# Patient Record
Sex: FEMALE | Race: WHITE | NOT HISPANIC OR LATINO | Employment: FULL TIME | URBAN - METROPOLITAN AREA
[De-identification: names, ages, dates, MRNs, and addresses within clinical notes are randomized per-mention and may not be internally consistent; named-entity substitution may affect disease eponyms.]

---

## 2017-11-15 ENCOUNTER — OFFICE VISIT (OUTPATIENT)
Dept: OBSTETRICS AND GYNECOLOGY | Facility: CLINIC | Age: 27
End: 2017-11-15

## 2017-11-15 VITALS
HEIGHT: 64 IN | SYSTOLIC BLOOD PRESSURE: 124 MMHG | WEIGHT: 292 LBS | BODY MASS INDEX: 49.85 KG/M2 | DIASTOLIC BLOOD PRESSURE: 84 MMHG

## 2017-11-15 DIAGNOSIS — Z12.4 PAP SMEAR FOR CERVICAL CANCER SCREENING: ICD-10-CM

## 2017-11-15 DIAGNOSIS — E66.01 OBESITY, MORBID, BMI 50 OR HIGHER (HCC): ICD-10-CM

## 2017-11-15 DIAGNOSIS — N97.0 ANOVULATORY BLEEDING: ICD-10-CM

## 2017-11-15 DIAGNOSIS — Z01.419 WELL FEMALE EXAM WITH ROUTINE GYNECOLOGICAL EXAM: Primary | ICD-10-CM

## 2017-11-15 DIAGNOSIS — Z13.89 SCREENING FOR BLOOD OR PROTEIN IN URINE: ICD-10-CM

## 2017-11-15 DIAGNOSIS — Z11.3 SCREENING FOR STD (SEXUALLY TRANSMITTED DISEASE): ICD-10-CM

## 2017-11-15 LAB
BILIRUB BLD-MCNC: NEGATIVE MG/DL
CLARITY, POC: CLEAR
COLOR UR: YELLOW
GLUCOSE UR STRIP-MCNC: NEGATIVE MG/DL
KETONES UR QL: NEGATIVE
LEUKOCYTE EST, POC: ABNORMAL
NITRITE UR-MCNC: NEGATIVE MG/ML
PH UR: 7 [PH] (ref 5–8)
PROT UR STRIP-MCNC: NEGATIVE MG/DL
RBC # UR STRIP: ABNORMAL /UL
SP GR UR: 1.02 (ref 1–1.03)
UROBILINOGEN UR QL: NORMAL

## 2017-11-15 PROCEDURE — 99395 PREV VISIT EST AGE 18-39: CPT | Performed by: OBSTETRICS & GYNECOLOGY

## 2017-11-15 PROCEDURE — 81002 URINALYSIS NONAUTO W/O SCOPE: CPT | Performed by: OBSTETRICS & GYNECOLOGY

## 2017-11-15 RX ORDER — MEDROXYPROGESTERONE ACETATE 10 MG/1
10 TABLET ORAL DAILY
Qty: 30 TABLET | Refills: 4 | Status: SHIPPED | OUTPATIENT
Start: 2017-11-15 | End: 2017-11-25

## 2017-11-15 NOTE — PROGRESS NOTES
[facility]  Routine Annual Visit    11/15/2017    Patient: Zoila MERRITT          MR#:[id number]      History of Present Illness    27 y.o. female  who presents for annual exam.    The patient reports a long-standing problem with irregular bleeding and irregular periods.  The patient states most recently she and skipping cycles for several months and then had prolonged bleeding lasting July through October.  We discussed options for management of patient does not wish to take oral contraceptive pills.  The patient does not desire pregnancy and her  is considering vasectomy.    Patient's last menstrual period was 11/10/2017.  Obstetric History:  OB History      Para Term  AB Living    0 0 0 0 0 0    SAB TAB Ectopic Multiple Live Births    0 0 0 0          Menstrual History:     Patient's last menstrual period was 11/10/2017.       Sexual History:       ________________________________________  Patient Active Problem List   Diagnosis   • Well female exam with routine gynecological exam   • Anovulatory bleeding   • Obesity, morbid, BMI 50 or higher       Past Medical History:   Diagnosis Date   • Bartholin cyst 2011       Past Surgical History:   Procedure Laterality Date   • ENDOSCOPY      Dr. Garnica   • KNEE SURGERY Right        History   Smoking Status   • Former Smoker   • Packs/day: 0.50   • Years: 2.00   • Types: Cigarettes   Smokeless Tobacco   • Never Used       Family History   Problem Relation Age of Onset   • Hypertension Mother    • Diabetes Mother    • Heart attack Mother    • Lymphoma Father    • Diabetes Maternal Aunt    • Diabetes Maternal Uncle    • Breast cancer Paternal Grandmother        Prior to Admission medications    Not on File     ________________________________________    Current contraception: none  History of abnormal Pap smear: no  Family history of uterine or ovarian cancer: no  Family History of colon cancer/colon polyps:  "no  History of abnormal mammogram: no  History of abnormal lipids: no    The following portions of the patient's history were reviewed and updated as appropriate: allergies, current medications, past family history, past medical history, past social history, past surgical history and problem list.    Review of Systems    Pertinent items are noted in HPI.     Objective   Physical Exam    /84  Ht 64\" (162.6 cm)  Wt 292 lb (132 kg)  LMP 11/10/2017  Breastfeeding? No  BMI 50.12 kg/m2   BP Readings from Last 3 Encounters:   11/15/17 124/84   06/22/16 132/90      Wt Readings from Last 3 Encounters:   11/15/17 292 lb (132 kg)   06/22/16 258 lb (117 kg)        BMI: Estimated body mass index is 50.12 kg/(m^2) as calculated from the following:    Height as of this encounter: 64\" (162.6 cm).    Weight as of this encounter: 292 lb (132 kg).    General:   alert, appears stated age and cooperative   Heart: regular rate and rhythm, S1, S2 normal, no murmur, click, rub or gallop   Lungs: clear to auscultation bilaterally   Abdomen: soft, non-tender, without masses or organomegaly and obese   Breast: inspection negative, no nipple discharge or bleeding, no masses or nodularity palpable with piercings   Vulva: normal   Vagina: normal mucosa   Cervix: no lesions   Uterus: normal size or exam limited by habitus   Adnexa: exam limited by habitus     As part of wellness and prevention, the following topics were discussed with the patient:  []  Nutrition  []  Physical activity/regular exercise   [x]  Healthy weight  []  Injury prevention  []  Substance misuse/abuse  []  Sexual behavior  []  STD prevention  [x]  Contaception  []  Dental health  []  Mental health  []  Immunization  [x]  Encouraged SBE       Assessment:    Zoila was seen today for gynecologic exam.    Diagnoses and all orders for this visit:    Well female exam with routine gynecological exam    Screening for blood or protein in urine  -     POC Urinalysis " Dipstick    Pap smear for cervical cancer screening  -     IGP, Rfx Aptima HPV ASCU - ThinPrep Vial, Cervix    Anovulatory bleeding  -     TSH  -     Luteinizing hormone  -     Follicle stimulating hormone  -     Prolactin  -     T4, free    Obesity, morbid, BMI 50 or higher  -     CBC & Differential  -     Comprehensive Metabolic Panel  -     Hemoglobin A1c    Screening for STD (sexually transmitted disease)  -     Chlamydia trachomatis, Neisseria gonorrhoeae, Trichomonas vaginalis, PCR - Swab, Cervix    Other orders  -     medroxyPROGESTERone (PROVERA) 10 MG tablet; Take 1 tablet by mouth Daily for 10 days.          Plan:  Return in about 1 year (around 11/15/2018) for Annual GYN exam.      Jamel Carver MD  11/15/2017 2:33 PM

## 2017-11-16 ENCOUNTER — TELEPHONE (OUTPATIENT)
Dept: OBSTETRICS AND GYNECOLOGY | Facility: CLINIC | Age: 27
End: 2017-11-16

## 2017-11-16 LAB
ALBUMIN SERPL-MCNC: 4.4 G/DL (ref 3.5–5.5)
ALBUMIN/GLOB SERPL: 1.4 {RATIO} (ref 1.2–2.2)
ALP SERPL-CCNC: 85 IU/L (ref 39–117)
ALT SERPL-CCNC: 19 IU/L (ref 0–32)
AST SERPL-CCNC: 21 IU/L (ref 0–40)
BASOPHILS # BLD AUTO: 0 X10E3/UL (ref 0–0.2)
BASOPHILS NFR BLD AUTO: 0 %
BILIRUB SERPL-MCNC: <0.2 MG/DL (ref 0–1.2)
BUN SERPL-MCNC: 8 MG/DL (ref 6–20)
BUN/CREAT SERPL: 10 (ref 9–23)
CALCIUM SERPL-MCNC: 9.8 MG/DL (ref 8.7–10.2)
CHLORIDE SERPL-SCNC: 99 MMOL/L (ref 96–106)
CO2 SERPL-SCNC: 21 MMOL/L (ref 18–29)
CREAT SERPL-MCNC: 0.8 MG/DL (ref 0.57–1)
EOSINOPHIL # BLD AUTO: 0.3 X10E3/UL (ref 0–0.4)
EOSINOPHIL NFR BLD AUTO: 3 %
ERYTHROCYTE [DISTWIDTH] IN BLOOD BY AUTOMATED COUNT: 14.5 % (ref 12.3–15.4)
FSH SERPL-ACNC: 6.7 MIU/ML
GFR SERPLBLD CREATININE-BSD FMLA CKD-EPI: 100 ML/MIN/1.73
GFR SERPLBLD CREATININE-BSD FMLA CKD-EPI: 115 ML/MIN/1.73
GLOBULIN SER CALC-MCNC: 3.1 G/DL (ref 1.5–4.5)
GLUCOSE SERPL-MCNC: 90 MG/DL (ref 65–99)
HBA1C MFR BLD: 5.7 % (ref 4.8–5.6)
HCT VFR BLD AUTO: 38.2 % (ref 34–46.6)
HGB BLD-MCNC: 12.8 G/DL (ref 11.1–15.9)
IMM GRANULOCYTES # BLD: 0 X10E3/UL (ref 0–0.1)
IMM GRANULOCYTES NFR BLD: 0 %
LH SERPL-ACNC: 3.6 MIU/ML
LYMPHOCYTES # BLD AUTO: 3.8 X10E3/UL (ref 0.7–3.1)
LYMPHOCYTES NFR BLD AUTO: 32 %
MCH RBC QN AUTO: 28.6 PG (ref 26.6–33)
MCHC RBC AUTO-ENTMCNC: 33.5 G/DL (ref 31.5–35.7)
MCV RBC AUTO: 86 FL (ref 79–97)
MONOCYTES # BLD AUTO: 0.7 X10E3/UL (ref 0.1–0.9)
MONOCYTES NFR BLD AUTO: 6 %
NEUTROPHILS # BLD AUTO: 6.9 X10E3/UL (ref 1.4–7)
NEUTROPHILS NFR BLD AUTO: 59 %
PLATELET # BLD AUTO: 445 X10E3/UL (ref 150–379)
POTASSIUM SERPL-SCNC: 4.8 MMOL/L (ref 3.5–5.2)
PROLACTIN SERPL-MCNC: 18.2 NG/ML (ref 4.8–23.3)
PROT SERPL-MCNC: 7.5 G/DL (ref 6–8.5)
RBC # BLD AUTO: 4.47 X10E6/UL (ref 3.77–5.28)
SODIUM SERPL-SCNC: 142 MMOL/L (ref 134–144)
T4 FREE SERPL-MCNC: 1.1 NG/DL (ref 0.82–1.77)
TSH SERPL DL<=0.005 MIU/L-ACNC: 3.47 UIU/ML (ref 0.45–4.5)
WBC # BLD AUTO: 11.9 X10E3/UL (ref 3.4–10.8)

## 2017-11-16 NOTE — TELEPHONE ENCOUNTER
----- Message from Jamel Carver MD sent at 11/16/2017 10:45 AM EST -----  Call pt:  Hormonal profile for abnormal bleeding is normal    A1c is mildly elevated suggesting early onset of glucose intolerance and diabetes    Carbohydrate restriction and weight reduction is in order    Take Provera for cycle regulation as instructed

## 2017-11-16 NOTE — PROGRESS NOTES
Call pt:  Hormonal profile for abnormal bleeding is normal    A1c is mildly elevated suggesting early onset of glucose intolerance and diabetes    Carbohydrate restriction and weight reduction is in order    Take Provera for cycle regulation as instructed

## 2017-11-17 LAB
C TRACH RRNA SPEC QL NAA+PROBE: NEGATIVE
N GONORRHOEA RRNA SPEC QL NAA+PROBE: NEGATIVE
T VAGINALIS RRNA SPEC QL NAA+PROBE: NEGATIVE

## 2017-11-20 ENCOUNTER — TELEPHONE (OUTPATIENT)
Dept: OBSTETRICS AND GYNECOLOGY | Facility: CLINIC | Age: 27
End: 2017-11-20

## 2017-11-20 LAB
CONV .: NORMAL
CYTOLOGIST CVX/VAG CYTO: NORMAL
CYTOLOGY CVX/VAG DOC THIN PREP: NORMAL
DX ICD CODE: NORMAL
HIV 1 & 2 AB SER-IMP: NORMAL
OTHER STN SPEC: NORMAL
PATH REPORT.FINAL DX SPEC: NORMAL
PATHOLOGIST CVX/VAG CYTO: NORMAL
STAT OF ADQ CVX/VAG CYTO-IMP: NORMAL

## 2017-11-20 NOTE — TELEPHONE ENCOUNTER
----- Message from Jamel Carver MD sent at 11/20/2017 12:43 PM EST -----  Call pt:  PAP is negative.

## 2017-11-21 ENCOUNTER — TELEPHONE (OUTPATIENT)
Dept: OBSTETRICS AND GYNECOLOGY | Facility: CLINIC | Age: 27
End: 2017-11-21

## 2021-07-26 ENCOUNTER — OFFICE VISIT (OUTPATIENT)
Dept: OBSTETRICS AND GYNECOLOGY | Age: 31
End: 2021-07-26

## 2021-07-26 VITALS
DIASTOLIC BLOOD PRESSURE: 80 MMHG | HEIGHT: 64 IN | BODY MASS INDEX: 50.02 KG/M2 | WEIGHT: 293 LBS | SYSTOLIC BLOOD PRESSURE: 130 MMHG

## 2021-07-26 DIAGNOSIS — Z12.4 SCREENING FOR MALIGNANT NEOPLASM OF CERVIX: ICD-10-CM

## 2021-07-26 DIAGNOSIS — Z11.51 SCREENING FOR HUMAN PAPILLOMAVIRUS (HPV): ICD-10-CM

## 2021-07-26 DIAGNOSIS — Z01.419 WELL FEMALE EXAM WITH ROUTINE GYNECOLOGICAL EXAM: Primary | ICD-10-CM

## 2021-07-26 DIAGNOSIS — N97.0 ANOVULATORY BLEEDING: ICD-10-CM

## 2021-07-26 DIAGNOSIS — E66.01 OBESITY, MORBID, BMI 50 OR HIGHER (HCC): ICD-10-CM

## 2021-07-26 PROCEDURE — 99213 OFFICE O/P EST LOW 20 MIN: CPT | Performed by: OBSTETRICS & GYNECOLOGY

## 2021-07-26 PROCEDURE — 99385 PREV VISIT NEW AGE 18-39: CPT | Performed by: OBSTETRICS & GYNECOLOGY

## 2021-07-26 RX ORDER — MEDROXYPROGESTERONE ACETATE 10 MG/1
10 TABLET ORAL DAILY
Qty: 30 TABLET | Refills: 3 | Status: SHIPPED | OUTPATIENT
Start: 2021-07-26 | End: 2021-12-06

## 2021-07-26 NOTE — PROGRESS NOTES
Routine Annual Visit    2021    Patient: Zoila Harden          MR#:[id number]    History of Present Illness    Chief Complaint   Patient presents with   • Gynecologic Exam     last pap 2017 neg    • Menstrual Problem     pt c/o irregular periods missing one in  and April, started end of May an bled for 7 weeks then stopped, started bleding with intercourse Saturday but stopped. She did notice a little bit of blood this morning when she wiped        30 y.o. female  who presents for annual exam.    The patient presents for routine annual exam with complaint of irregularities in her cycle the last 6 months.  The patient has a long history of chronic anovulation complicated by a class III morbid obesity.  The patient does not desire pregnancy but does not want to do anything to prevent pregnancy.    Studies reviewed:    No LMP recorded.  Obstetric History:  OB History        0    Para   0    Term   0       0    AB   0    Living   0       SAB   0    TAB   0    Ectopic   0    Molar        Multiple   0    Live Births                   Menstrual History:     No LMP recorded.       Sexual History:       ________________________________________  Patient Active Problem List   Diagnosis   • Well female exam with routine gynecological exam   • Anovulatory bleeding   • Obesity, morbid, BMI 50 or higher (CMS/HCC)     Past Medical History:   Diagnosis Date   • Bartholin cyst 2011     Past Surgical History:   Procedure Laterality Date   • ENDOSCOPY      Dr. Garnica   • KNEE SURGERY Right      Social History     Tobacco Use   Smoking Status Former Smoker   • Packs/day: 0.50   • Years: 2.00   • Pack years: 1.00   • Types: Cigarettes   Smokeless Tobacco Never Used     Family History   Problem Relation Age of Onset   • Hypertension Mother    • Diabetes Mother    • Heart attack Mother    • Lymphoma Father    • Diabetes Maternal Aunt    • Diabetes Maternal Uncle    • Breast cancer Paternal  "Grandmother      Prior to Admission medications    Not on File     ________________________________________    Current contraception: none  History of abnormal Pap smear: no  Family history of uterine or ovarian cancer: no  Family History of colon cancer/colon polyps: no  History of abnormal mammogram: no  History of abnormal lipids: no    The following portions of the patient's history were reviewed and updated as appropriate: allergies, current medications, past family history, past medical history, past social history, past surgical history and problem list.    Review of Systems    Pertinent items are noted in HPI.       Objective   Physical Exam    /80   Ht 162.6 cm (64\")   Wt (!) 138 kg (304 lb)   BMI 52.18 kg/m²    BP Readings from Last 3 Encounters:   07/26/21 130/80   11/15/17 124/84   06/22/16 132/90      Wt Readings from Last 3 Encounters:   07/26/21 (!) 138 kg (304 lb)   11/15/17 132 kg (292 lb)   06/22/16 117 kg (258 lb)        BMI: Estimated body mass index is 52.18 kg/m² as calculated from the following:    Height as of this encounter: 162.6 cm (64\").    Weight as of this encounter: 138 kg (304 lb).       General: alert, appears stated age, cooperative and morbidly obese   Heart: regular rate and rhythm, S1, S2 normal, no murmur, click, rub or gallop   Lungs: clear to auscultation bilaterally   Abdomen: obes and soft, non-tender, without masses, no organomegaly   Breast: inspection negative, no nipple discharge or bleeding, no masses or nodularity palpable   External genitalia/Vulva: External genitalia including bartholin's glands, Urethra, Belk's gland and urethra meatus are normal, Perineum, rectum and anus appear normal  and Bladder appears normal without significant prolapse    Vagina: normal mucosa, normal discharge   Cervix: no lesions and old blood   Uterus: normal size, mobile, non-tender and exam is limited habitus    Adnexa: exam limited by habitus     As part of wellness and " prevention, the following topics were discussed with the patient:  Encouraged self breast exam  Physical activity and regular exercised encouraged.       Assessment:    Diagnoses and all orders for this visit:    1. Well female exam with routine gynecological exam (Primary)  -     IgP, Aptima HPV    2. Anovulatory bleeding  -     medroxyPROGESTERone (Provera) 10 MG tablet; Take 1 tablet by mouth Daily for 10 days. Take one tablet for 10 days each month  Dispense: 30 tablet; Refill: 3    3. Obesity, morbid, BMI 50 or higher (CMS/HCC)    4. Screening for human papillomavirus (HPV)  -     IgP, Aptima HPV    5. Screening for malignant neoplasm of cervix  -     IgP, Aptima HPV        Plan:  Return in about 1 year (around 7/26/2022) for Annual GYN exam.  Pelvic ultrasound to evaluate endometrial thickness    Jamel Carver MD  7/26/2021 09:38 EDT

## 2021-07-28 LAB
CYTOLOGIST CVX/VAG CYTO: NORMAL
CYTOLOGY CVX/VAG DOC CYTO: NORMAL
CYTOLOGY CVX/VAG DOC THIN PREP: NORMAL
DX ICD CODE: NORMAL
HIV 1 & 2 AB SER-IMP: NORMAL
HPV I/H RISK 4 DNA CVX QL PROBE+SIG AMP: NEGATIVE
OTHER STN SPEC: NORMAL
STAT OF ADQ CVX/VAG CYTO-IMP: NORMAL

## 2021-12-05 DIAGNOSIS — N97.0 ANOVULATORY BLEEDING: ICD-10-CM

## 2021-12-06 RX ORDER — MEDROXYPROGESTERONE ACETATE 10 MG/1
10 TABLET ORAL DAILY
Qty: 30 TABLET | Refills: 3 | Status: SHIPPED | OUTPATIENT
Start: 2021-12-06 | End: 2021-12-16

## 2023-05-24 ENCOUNTER — OFFICE VISIT (OUTPATIENT)
Dept: OBSTETRICS AND GYNECOLOGY | Age: 33
End: 2023-05-24
Payer: COMMERCIAL

## 2023-05-24 VITALS
WEIGHT: 280 LBS | SYSTOLIC BLOOD PRESSURE: 130 MMHG | HEIGHT: 64 IN | DIASTOLIC BLOOD PRESSURE: 70 MMHG | BODY MASS INDEX: 47.8 KG/M2

## 2023-05-24 DIAGNOSIS — E66.01 MORBID OBESITY WITH BMI OF 45.0-49.9, ADULT: ICD-10-CM

## 2023-05-24 DIAGNOSIS — N97.1 INFERTILITY, TUBAL ORIGIN: ICD-10-CM

## 2023-05-24 DIAGNOSIS — Z11.51 SCREENING FOR HUMAN PAPILLOMAVIRUS (HPV): ICD-10-CM

## 2023-05-24 DIAGNOSIS — Z01.419 WELL FEMALE EXAM WITH ROUTINE GYNECOLOGICAL EXAM: Primary | ICD-10-CM

## 2023-05-24 DIAGNOSIS — Z13.89 SCREENING FOR BLOOD OR PROTEIN IN URINE: ICD-10-CM

## 2023-05-24 DIAGNOSIS — N91.2 ANOVULATORY AMENORRHEA: ICD-10-CM

## 2023-05-24 DIAGNOSIS — Z11.3 SCREEN FOR STD (SEXUALLY TRANSMITTED DISEASE): ICD-10-CM

## 2023-05-24 DIAGNOSIS — Z12.4 SCREENING FOR MALIGNANT NEOPLASM OF CERVIX: ICD-10-CM

## 2023-05-24 PROBLEM — N97.0 ANOVULATORY BLEEDING: Status: RESOLVED | Noted: 2017-11-15 | Resolved: 2023-05-24

## 2023-05-24 LAB
BILIRUB BLD-MCNC: NEGATIVE MG/DL
CLARITY, POC: CLEAR
COLOR UR: YELLOW
GLUCOSE UR STRIP-MCNC: NEGATIVE MG/DL
KETONES UR QL: NEGATIVE
LEUKOCYTE EST, POC: NEGATIVE
NITRITE UR-MCNC: NEGATIVE MG/ML
PH UR: 5.5 [PH] (ref 5–8)
PROT UR STRIP-MCNC: NEGATIVE MG/DL
RBC # UR STRIP: ABNORMAL /UL
SP GR UR: 1.03 (ref 1–1.03)
UROBILINOGEN UR QL: NORMAL

## 2023-05-24 NOTE — ASSESSMENT & PLAN NOTE
Previously placed on Provera for cycle regulation and prevention of endometrial hyperplasia.  The patient quit taking the medicine in October and has since had fairly spontaneous regular menstrual periods.  With her periods being fairly regular she wishes to discontinue Provera at this time.  We discussed the risk of endometrial hyperplasia associated chronic anovulation and no menstrual cycles.  The patient is instructed to restart the Provera if she goes more than 2 months without having a period.

## 2023-05-24 NOTE — PROGRESS NOTES
[facility]   Obstetrics and Gynecology     2023    Patient: Zoila Harden          MR#:[id number]    History of Present Illness    Chief Complaint   Patient presents with   • Gynecologic Exam     CC: Annual, last pap 21 neg, HPV neg       32 y.o. female  who presents for annual exam.    Relevant data reviewed:    Patient's last menstrual period was 2023.  Obstetric History:  OB History        0    Para   0    Term   0       0    AB   0    Living   0       SAB   0    IAB   0    Ectopic   0    Molar        Multiple   0    Live Births                   Menstrual History:     Patient's last menstrual period was 2023.       Social History     Substance and Sexual Activity   Sexual Activity Yes   • Partners: Male   • Birth control/protection: None     ______________________________________  Patient Active Problem List   Diagnosis   • Morbid obesity with BMI of 45.0-49.9, adult   • Anovulatory amenorrhea   • Infertility, tubal origin     Past Medical History:   Diagnosis Date   • Bartholin cyst 2011     Past Surgical History:   Procedure Laterality Date   • ENDOSCOPY      Dr. Garnica   • KNEE SURGERY Right      Social History     Tobacco Use   Smoking Status Former   • Packs/day: 0.50   • Years: 2.00   • Pack years: 1.00   • Types: Cigarettes   • Passive exposure: Past   Smokeless Tobacco Never     Family History   Problem Relation Age of Onset   • Lymphoma Father    • Hypertension Mother    • Diabetes Mother    • Heart attack Mother    • Breast cancer Paternal Grandmother    • Diabetes Maternal Aunt    • Diabetes Maternal Uncle    • Ovarian cancer Neg Hx    • Uterine cancer Neg Hx    • Colon cancer Neg Hx      Prior to Admission medications    Not on File     _______________________________________    Current contraception: none  History of abnormal Pap smear: no  Family history of uterine or ovarian cancer: no  Family History of colon cancer/colon  "polyps: no  History of abnormal mammogram: no  History of abnormal lipids: no    The following portions of the patient's history were reviewed and updated as appropriate: allergies, current medications, past family history, past medical history, past social history, past surgical history and problem list.    Review of Systems    Pertinent items are noted in HPI.       Objective   Physical Exam    /70   Ht 162.6 cm (64\")   Wt 127 kg (280 lb)   LMP 2023   BMI 48.06 kg/m²    BP Readings from Last 3 Encounters:   23 130/70   21 130/80   11/15/17 124/84      Wt Readings from Last 3 Encounters:   23 127 kg (280 lb)   21 (!) 138 kg (304 lb)   11/15/17 132 kg (292 lb)        BMI: Estimated body mass index is 48.06 kg/m² as calculated from the following:    Height as of this encounter: 162.6 cm (64\").    Weight as of this encounter: 127 kg (280 lb).       General: alert, appears stated age, cooperative and mildly obese   Heart: regular rate and rhythm, S1, S2 normal, no murmur, click, rub or gallop   Lungs: clear to auscultation bilaterally   Abdomen: soft, non-tender, without masses, no organomegaly   Breast: inspection negative, no nipple discharge or bleeding, no masses or nodularity palpable   External genitalia/Vulva: External genitalia including bartholin's glands, Urethra, [place]'s gland and urethra meatus are normal, Perineum, rectum and anus appear normal  and Bladder appears normal without significant prolapse    Vagina: normal mucosa, normal discharge   Cervix: no lesions   Uterus: normal size and non-tender   Adnexa: normal adnexa     As part of wellness and prevention, the following topics were discussed with the patient:  Encouraged self breast exam  Physical activity and regular exercised encouraged.   Healthy weight discussed.  Contraception discussed.         Problem List   Meds  History  Prep for Surg   Imagin}    Assessment:  Diagnoses and all orders for this " visit:    1. Well female exam with routine gynecological exam (Primary)  -     IGP, Apt HPV,rfx 16 / 18,45    2. Screening for blood or protein in urine  -     POC Urinalysis Dipstick    3. Screening for human papillomavirus (HPV)  -     IGP, Apt HPV,rfx 16 / 18,45    4. Screening for malignant neoplasm of cervix  -     IGP, Apt HPV,rfx 16 / 18,45    5. Screen for STD (sexually transmitted disease)  -     NuSwab VG+ - Swab, Vagina    6. Morbid obesity with BMI of 45.0-49.9, adult    7. Anovulatory amenorrhea  Assessment & Plan:  Previously placed on Provera for cycle regulation and prevention of endometrial hyperplasia.  The patient quit taking the medicine in October and has since had fairly spontaneous regular menstrual periods.  With her periods being fairly regular she wishes to discontinue Provera at this time.  We discussed the risk of endometrial hyperplasia associated chronic anovulation and no menstrual cycles.  The patient is instructed to restart the Provera if she goes more than 2 months without having a period.      8. Infertility, tubal origin  Assessment & Plan:  Longstanding failure to conceive in the setting of no prevention.  Not opposed to pregnancy but does not desire contraception at this time      Plan:  Return in 1 year (on 5/24/2024) for Annual exam.    Jamel Carver MD  5/24/2023 08:39 EDT

## 2023-05-24 NOTE — ASSESSMENT & PLAN NOTE
Longstanding failure to conceive in the setting of no prevention.  Not opposed to pregnancy but does not desire contraception at this time

## 2023-05-26 DIAGNOSIS — N76.0 BV (BACTERIAL VAGINOSIS): Primary | ICD-10-CM

## 2023-05-26 DIAGNOSIS — B96.89 BV (BACTERIAL VAGINOSIS): Primary | ICD-10-CM

## 2023-05-26 LAB
A VAGINAE DNA VAG QL NAA+PROBE: ABNORMAL SCORE
BVAB2 DNA VAG QL NAA+PROBE: ABNORMAL SCORE
C ALBICANS DNA VAG QL NAA+PROBE: NEGATIVE
C GLABRATA DNA VAG QL NAA+PROBE: NEGATIVE
C TRACH DNA VAG QL NAA+PROBE: NEGATIVE
MEGA1 DNA VAG QL NAA+PROBE: ABNORMAL SCORE
N GONORRHOEA DNA VAG QL NAA+PROBE: NEGATIVE
T VAGINALIS DNA VAG QL NAA+PROBE: NEGATIVE

## 2023-05-26 RX ORDER — METRONIDAZOLE 500 MG/1
500 TABLET ORAL 2 TIMES DAILY
Qty: 14 TABLET | Refills: 0 | Status: SHIPPED | OUTPATIENT
Start: 2023-05-26 | End: 2023-06-02

## 2024-05-29 ENCOUNTER — OFFICE VISIT (OUTPATIENT)
Dept: OBSTETRICS AND GYNECOLOGY | Age: 34
End: 2024-05-29
Payer: COMMERCIAL

## 2024-05-29 VITALS
DIASTOLIC BLOOD PRESSURE: 70 MMHG | SYSTOLIC BLOOD PRESSURE: 128 MMHG | BODY MASS INDEX: 49.85 KG/M2 | HEIGHT: 64 IN | WEIGHT: 292 LBS

## 2024-05-29 DIAGNOSIS — N91.2 ANOVULATORY AMENORRHEA: ICD-10-CM

## 2024-05-29 DIAGNOSIS — Z12.4 SCREENING FOR MALIGNANT NEOPLASM OF CERVIX: ICD-10-CM

## 2024-05-29 DIAGNOSIS — Z13.89 SCREENING FOR BLOOD OR PROTEIN IN URINE: ICD-10-CM

## 2024-05-29 DIAGNOSIS — E66.01 MORBID OBESITY WITH BMI OF 50.0-59.9, ADULT: ICD-10-CM

## 2024-05-29 DIAGNOSIS — Z11.51 SCREENING FOR HUMAN PAPILLOMAVIRUS (HPV): ICD-10-CM

## 2024-05-29 DIAGNOSIS — N97.1 INFERTILITY, TUBAL ORIGIN: ICD-10-CM

## 2024-05-29 DIAGNOSIS — Z01.419 WELL FEMALE EXAM WITH ROUTINE GYNECOLOGICAL EXAM: Primary | ICD-10-CM

## 2024-05-29 LAB
BILIRUB BLD-MCNC: NEGATIVE MG/DL
CLARITY, POC: CLEAR
COLOR UR: YELLOW
GLUCOSE UR STRIP-MCNC: NEGATIVE MG/DL
KETONES UR QL: NEGATIVE
LEUKOCYTE EST, POC: ABNORMAL
NITRITE UR-MCNC: NEGATIVE MG/ML
PH UR: 6.5 [PH] (ref 5–8)
PROT UR STRIP-MCNC: NEGATIVE MG/DL
RBC # UR STRIP: NEGATIVE /UL
SP GR UR: 1.02 (ref 1–1.03)
UROBILINOGEN UR QL: ABNORMAL

## 2024-05-29 RX ORDER — MEDROXYPROGESTERONE ACETATE 10 MG/1
10 TABLET ORAL DAILY
COMMUNITY
End: 2024-05-29 | Stop reason: SDUPTHER

## 2024-05-29 RX ORDER — MEDROXYPROGESTERONE ACETATE 10 MG/1
10 TABLET ORAL DAILY
Qty: 30 TABLET | Refills: 4 | Status: SHIPPED | OUTPATIENT
Start: 2024-05-29 | End: 2024-06-08

## 2024-05-29 NOTE — PROGRESS NOTES
[facility]   Obstetrics and Gynecology     2024    Patient: Zoila Harden          MR#:[id number]    History of Present Illness    Chief Complaint   Patient presents with    Gynecologic Exam     CC: Annual, last pap 23 neg, HPV neg       33 y.o. female  who presents for annual exam.    The patient presents for her regular exam feeling well without complaints.  She uses Provera for periodic anovulatory amenorrhea with her period lasting approximately 5 days        Relevant data reviewed:    Patient's last menstrual period was 2024.  Obstetric History:  OB History          0    Para   0    Term   0       0    AB   0    Living   0         SAB   0    IAB   0    Ectopic   0    Molar        Multiple   0    Live Births                   Menstrual History:     Patient's last menstrual period was 2024.       Social History     Substance and Sexual Activity   Sexual Activity Yes    Partners: Male    Birth control/protection: None     ______________________________________  Patient Active Problem List   Diagnosis    Anovulatory amenorrhea    Infertility, tubal origin    Morbid obesity with BMI of 50.0-59.9, adult     Past Medical History:   Diagnosis Date    Bartholin cyst 2011     Past Surgical History:   Procedure Laterality Date    ENDOSCOPY      Dr. Garnica    KNEE SURGERY Right      Social History     Tobacco Use   Smoking Status Former    Current packs/day: 0.50    Average packs/day: 0.5 packs/day for 2.0 years (1.0 ttl pk-yrs)    Types: Cigarettes    Passive exposure: Past   Smokeless Tobacco Never     Family History   Problem Relation Age of Onset    Lymphoma Father     Hypertension Mother     Diabetes Mother     Heart attack Mother     Breast cancer Paternal Grandmother     Diabetes Maternal Aunt     Diabetes Maternal Uncle     Ovarian cancer Neg Hx     Uterine cancer Neg Hx     Colon cancer Neg Hx      Prior to Admission medications    Medication  "Sig Start Date End Date Taking? Authorizing Provider   medroxyPROGESTERone (PROVERA) 10 MG tablet Take 1 tablet by mouth Daily.   Yes Provider, MD Anaya     _______________________________________    Current contraception:  infertility   History of abnormal Pap smear: no  Family history of uterine or ovarian cancer: no  Family History of colon cancer/colon polyps: no  History of abnormal mammogram: no  History of abnormal lipids: no    The following portions of the patient's history were reviewed and updated as appropriate: allergies, current medications, past family history, past medical history, past social history, past surgical history, and problem list.    Review of Systems    Pertinent items are noted in HPI.       Objective   Physical Exam    /70   Ht 162.6 cm (64\")   Wt 132 kg (292 lb)   LMP 05/06/2024   BMI 50.12 kg/m²    BP Readings from Last 3 Encounters:   05/29/24 128/70   05/24/23 130/70   07/26/21 130/80      Wt Readings from Last 3 Encounters:   05/29/24 132 kg (292 lb)   05/24/23 127 kg (280 lb)   07/26/21 (!) 138 kg (304 lb)        BMI: Estimated body mass index is 50.12 kg/m² as calculated from the following:    Height as of this encounter: 162.6 cm (64\").    Weight as of this encounter: 132 kg (292 lb).       General: alert, appears stated age, cooperative, and morbidly obese   Heart: regular rate and rhythm, S1, S2 normal, no murmur, click, rub or gallop   Lungs: clear to auscultation bilaterally   Abdomen: soft, non-tender, without masses, no organomegaly   Breast: inspection negative, no nipple discharge or bleeding, no masses or nodularity palpable   External genitalia/Vulva: External genitalia including bartholin's glands, Urethra, Ocean's gland and urethra meatus are normal, Perineum, rectum and anus appear normal , and Bladder appears normal without significant prolapse    Vagina: normal mucosa, normal discharge   Cervix: no lesions   Uterus: exam is limited habitus  "   Adnexa: exam limited by habitus     As part of wellness and prevention, the following topics were discussed with the patient:  Encouraged self breast exam  Physical activity and regular exercised encouraged.         Problem List   Meds  History  Prep for Surg   Imagin}    Assessment:  Diagnoses and all orders for this visit:    1. Well female exam with routine gynecological exam (Primary)  -     IGP, Apt HPV,rfx 16 / 18,45    2. Screening for human papillomavirus (HPV)  -     IGP, Apt HPV,rfx 16 / 18,45    3. Screening for malignant neoplasm of cervix  -     IGP, Apt HPV,rfx 16 / 18,45    4. Screening for blood or protein in urine  -     POC Urinalysis Dipstick    5. Anovulatory amenorrhea  -     medroxyPROGESTERone (PROVERA) 10 MG tablet; Take 1 tablet by mouth Daily for 10 days.  Dispense: 30 tablet; Refill: 4    6. Morbid obesity with BMI of 50.0-59.9, adult    7. Infertility, tubal origin  Overview:  Longstanding failure to conceive in the setting of no prevention.  Not opposed to pregnancy but does not desire contraception at this time        Plan:  Return in 1 year (on 2025) for Annual exam.    Jamel Carver MD  2024 08:38 EDT

## 2024-06-01 LAB
CYTOLOGIST CVX/VAG CYTO: NORMAL
CYTOLOGY CVX/VAG DOC CYTO: NORMAL
CYTOLOGY CVX/VAG DOC THIN PREP: NORMAL
DX ICD CODE: NORMAL
HPV I/H RISK 4 DNA CVX QL PROBE+SIG AMP: NEGATIVE
Lab: NORMAL
OTHER STN SPEC: NORMAL
STAT OF ADQ CVX/VAG CYTO-IMP: NORMAL

## 2025-06-02 ENCOUNTER — OFFICE VISIT (OUTPATIENT)
Dept: OBSTETRICS AND GYNECOLOGY | Age: 35
End: 2025-06-02
Payer: COMMERCIAL

## 2025-06-02 VITALS
SYSTOLIC BLOOD PRESSURE: 128 MMHG | WEIGHT: 293 LBS | HEIGHT: 64 IN | BODY MASS INDEX: 50.02 KG/M2 | DIASTOLIC BLOOD PRESSURE: 86 MMHG

## 2025-06-02 DIAGNOSIS — N91.2 ANOVULATORY AMENORRHEA: ICD-10-CM

## 2025-06-02 DIAGNOSIS — Z12.4 SCREENING FOR MALIGNANT NEOPLASM OF CERVIX: ICD-10-CM

## 2025-06-02 DIAGNOSIS — Z11.51 SCREENING FOR HUMAN PAPILLOMAVIRUS (HPV): ICD-10-CM

## 2025-06-02 DIAGNOSIS — Z01.419 WELL FEMALE EXAM WITH ROUTINE GYNECOLOGICAL EXAM: Primary | ICD-10-CM

## 2025-06-02 DIAGNOSIS — N97.1 INFERTILITY, TUBAL ORIGIN: ICD-10-CM

## 2025-06-02 DIAGNOSIS — E66.01 MORBID OBESITY WITH BMI OF 50.0-59.9, ADULT: ICD-10-CM

## 2025-06-02 DIAGNOSIS — Z13.89 SCREENING FOR BLOOD OR PROTEIN IN URINE: ICD-10-CM

## 2025-06-02 LAB
BILIRUB BLD-MCNC: NEGATIVE MG/DL
CLARITY, POC: CLEAR
COLOR UR: YELLOW
GLUCOSE UR STRIP-MCNC: NEGATIVE MG/DL
KETONES UR QL: NEGATIVE
LEUKOCYTE EST, POC: NEGATIVE
NITRITE UR-MCNC: NEGATIVE MG/ML
PH UR: 6.5 [PH] (ref 5–8)
PROT UR STRIP-MCNC: NEGATIVE MG/DL
RBC # UR STRIP: NEGATIVE /UL
SP GR UR: 1.01 (ref 1–1.03)
UROBILINOGEN UR QL: NORMAL

## 2025-06-02 RX ORDER — MEDROXYPROGESTERONE ACETATE 10 MG
10 TABLET ORAL DAILY
COMMUNITY

## 2025-06-02 NOTE — PROGRESS NOTES
[facility]   Obstetrics and Gynecology     2025    Patient: Zoila Harden          MR#:[id number]    History of Present Illness    Chief Complaint   Patient presents with    Gynecologic Exam     Annual, last pap 24 neg, hpv neg       34 y.o. female  who presents for annual exam.    Patient presents feeling well stating she had fairly regular periods for the last year but then last month had a prolonged 2-week bleed.  She has Provera for cycle regulation for history of anovulatory bleeding and plans to take a round of Provera at the end of this month.    Relevant data reviewed:    Patient's last menstrual period was 2025.  Obstetric History:  OB History          0    Para   0    Term   0       0    AB   0    Living   0         SAB   0    IAB   0    Ectopic   0    Molar        Multiple   0    Live Births                   Menstrual History:     Patient's last menstrual period was 2025.       Social History     Substance and Sexual Activity   Sexual Activity Yes    Partners: Male    Birth control/protection: None     ______________________________________  Patient Active Problem List   Diagnosis    Anovulatory amenorrhea    Infertility, tubal origin    Morbid obesity with BMI of 50.0-59.9, adult     Past Medical History:   Diagnosis Date    Bartholin cyst 2011     Past Surgical History:   Procedure Laterality Date    ENDOSCOPY      Dr. Garnica    KNEE SURGERY Right      Social History     Tobacco Use   Smoking Status Former    Current packs/day: 0.50    Average packs/day: 0.5 packs/day for 2.0 years (1.0 ttl pk-yrs)    Types: Cigarettes    Passive exposure: Past   Smokeless Tobacco Never     Family History   Problem Relation Age of Onset    Lymphoma Father     Hypertension Mother     Diabetes Mother     Heart attack Mother     Breast cancer Paternal Grandmother     Diabetes Maternal Aunt     Diabetes Maternal Uncle     Ovarian cancer Neg Hx      "Uterine cancer Neg Hx     Colon cancer Neg Hx      Prior to Admission medications    Medication Sig Start Date End Date Taking? Authorizing Provider   medroxyPROGESTERone (PROVERA) 10 MG tablet Take 1 tablet by mouth Daily.   Yes Provider, MD Anaya     _______________________________________    Current contraception: none  History of abnormal Pap smear: no  Family history of uterine or ovarian cancer: no  Family History of colon cancer/colon polyps: no  History of abnormal mammogram: no  History of abnormal lipids: no    The following portions of the patient's history were reviewed and updated as appropriate: allergies, current medications, past family history, past medical history, past social history, past surgical history, and problem list.    Review of Systems    Pertinent items are noted in HPI.       Objective   Physical Exam    /86   Ht 162.6 cm (64\")   Wt (!) 137 kg (301 lb)   LMP 05/05/2025   BMI 51.67 kg/m²    BP Readings from Last 3 Encounters:   06/02/25 128/86   05/29/24 128/70   05/24/23 130/70      Wt Readings from Last 3 Encounters:   06/02/25 (!) 137 kg (301 lb)   05/29/24 132 kg (292 lb)   05/24/23 127 kg (280 lb)        BMI: Estimated body mass index is 51.67 kg/m² as calculated from the following:    Height as of this encounter: 162.6 cm (64\").    Weight as of this encounter: 137 kg (301 lb).       General: alert, appears stated age, and cooperative   Heart: regular rate and rhythm, S1, S2 normal, no murmur, click, rub or gallop   Lungs: clear to auscultation bilaterally   Abdomen: soft, non-tender, without masses, no organomegaly   Breast: inspection negative, no nipple discharge or bleeding, no masses or nodularity palpable   External genitalia/Vulva: External genitalia including bartholin's glands, Urethra, [place]'s gland and urethra meatus are normal, Perineum, rectum and anus appear normal , and Bladder appears normal without significant prolapse    Vagina: normal mucosa, " normal discharge   Cervix: no lesions   Uterus: normal size and non-tender   Adnexa: normal adnexa     As part of wellness and prevention, the following topics were discussed with the patient:  Encouraged self breast exam  Physical activity and regular exercised encouraged.         Problem List   Meds  History  Prep for Surg   Imagin}    Assessment:     Well female exam with routine gynecological exam    Orders:    IGP, Apt HPV,rfx 16 / 18,45    Screening for human papillomavirus (HPV)    Orders:    IGP, Apt HPV,rfx 16 / 18,45    Screening for malignant neoplasm of cervix    Orders:    IGP, Apt HPV,rfx 16 / 18,45    Screening for blood or protein in urine    Orders:    POC Urinalysis Dipstick    Anovulatory amenorrhea  Well-managed with monthly Provera as needed       Infertility, tubal origin         Morbid obesity with BMI of 50.0-59.9, adult  Patient's (Body mass index is 51.67 kg/m².)          Plan:  Return in 1 year (on 2026) for Annual exam.    Jamel Carver MD  2025 11:12 EDT

## 2025-06-04 LAB
CYTOLOGIST CVX/VAG CYTO: ABNORMAL
CYTOLOGY CVX/VAG DOC CYTO: ABNORMAL
CYTOLOGY CVX/VAG DOC THIN PREP: ABNORMAL
DX ICD CODE: ABNORMAL
HPV I/H RISK 4 DNA CVX QL PROBE+SIG AMP: POSITIVE
HPV16 DNA CVX QL PROBE+SIG AMP: NEGATIVE
HPV18+45 E6+E7 MRNA CVX QL NAA+PROBE: NEGATIVE
OTHER STN SPEC: ABNORMAL
SERVICE CMNT-IMP: ABNORMAL
STAT OF ADQ CVX/VAG CYTO-IMP: ABNORMAL

## 2025-08-05 ENCOUNTER — TELEPHONE (OUTPATIENT)
Dept: OBSTETRICS AND GYNECOLOGY | Age: 35
End: 2025-08-05
Payer: COMMERCIAL

## 2025-08-05 RX ORDER — MEDROXYPROGESTERONE ACETATE 10 MG
10 TABLET ORAL DAILY
Qty: 30 TABLET | Refills: 3 | Status: SHIPPED | OUTPATIENT
Start: 2025-08-05 | End: 2025-08-15